# Patient Record
Sex: MALE | Race: WHITE | NOT HISPANIC OR LATINO | Employment: STUDENT | URBAN - METROPOLITAN AREA
[De-identification: names, ages, dates, MRNs, and addresses within clinical notes are randomized per-mention and may not be internally consistent; named-entity substitution may affect disease eponyms.]

---
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
## 2024-01-04 ENCOUNTER — PATIENT OUTREACH (OUTPATIENT)
Dept: GASTROENTEROLOGY | Facility: CLINIC | Age: 19
End: 2024-01-04
Payer: COMMERCIAL

## 2024-12-10 ENCOUNTER — MYC MEDICAL ADVICE (OUTPATIENT)
Dept: FAMILY MEDICINE | Facility: CLINIC | Age: 19
End: 2024-12-10
Payer: COMMERCIAL

## 2024-12-10 NOTE — TELEPHONE ENCOUNTER
Physical is scheduled too soon from recent one, scheduled on 12/20/2024 but not due until 12/22/2024. Sent mychart.    Rosaline Almeida CMA ([facility])

## 2024-12-12 NOTE — TELEPHONE ENCOUNTER
Per patient, insurance is per calendar year so ok to keep as scheduled.    Rosaline Almeida CMA ([facility])

## 2025-06-30 ENCOUNTER — TRANSFERRED RECORDS (OUTPATIENT)
Dept: ADMINISTRATIVE | Facility: CLINIC | Age: 20
End: 2025-06-30
Payer: COMMERCIAL

## 2025-07-01 PROBLEM — D12.6 TUBULAR ADENOMA OF COLON: Status: ACTIVE | Noted: 2025-07-01

## 2025-07-01 NOTE — PROCEDURES
[facility]   [address] [place] [address], [address], [place] [id number]     Patient Name: Jace Levine  Gender:  Male  Exam Date: 06/30/2025 Visit Number:  [id number]  Age: 20 Years YOB: 2005  Attending MD: Ever Olvera MD Medical Record#:  [id number]    Procedure: Colonoscopy   Indications: Previous adenomatous polyp(s)      Referring MD: Referral Self  Primary MD:      Donald FaheyAhrndt MD  Medications: Admitting Medications:   0.9% Normal Saline at TKO   Intra Procedure Medications:   Patient received monitored anesthesia care.     Complications: No immediate complications  ______________________________________________________________________________  Procedure:   An examination of the heart and lungs was performed and found to be within acceptable limits.  .  The patient was therefore deemed a reasonable candidate for endoscopy and sedation.   The risks and benefits of the procedure were explained to the patient.After obtaining informed consent, the patient received monitored anesthesia care and I passed the scope   without difficulty via the rectum to the ileum.  The appendiceal orifice and ic valve were identified.  The scope was retroflexed during the examination  The quality of the prep was excellent  (Miralax/Gatorade/2 tablets Bisacodyl/Magnesium Citrate).    This was a complete examination throughout the entire colon.    Findings:     Single aphthous ulcer in the ileum surrounded by normal appearing mucosa.  Biospies obtained.    Polyp location: sigmoid.  Quantity: 1.  Size: 5 mm.  Polyp shape:  sessile.         Maneuver: polypectomy was performed with a cold snare.       Removal:  complete.  Retrieval: complete.  Bleeding: none.  Remainder of the exam is normal.    Impression:  Colorectal polyp detected on colonoscopy  MD impression comments:  Avoid NSAIDs (Ibuprofen, alleve, advil, naproxen, etc)    Preliminary Plan:  Repeat colonoscopy in 5 years  Pathology  Results:  A: COLON, SIGMOID, POLYP:           1. Ganglioneuroma (see comment)           2. Background colonic mucosa is normal      B: ILEUM, TERMINAL, BIOPSY:           1. Nonspecific active ileitis (see comment)           2. Negative for chronic ileitis, granulomas, and dysplasia      COMMENTS  A. Ganglioneuromas are benign lesions composed of a mixture of ganglion and spindle cells.  When present as a solitary polyp (as appears to be the case here), they are a clinically innocuous. If multiple well-demarcated ganglioneuromas are present, they can be a marker for Cowden syndrome. Multiple poorly-demarcated ganglioneuromatous proliferations can be a marker for either multiple endocrine neoplasia (MEN)2B or neurofibromatosis (NF1).     Please contact us with any questions ([facility] GI pathology Service, [facility] extension  [phone]).      B. The presence of active ileitis is a nonspecific finding. The primary differential diagnostic considerations include medication induced inflammation (typically NSAIDs) and Crohn's disease. There is, however, no evidence of chronicity or specific features of Crohn's disease.      MICROSCOPIC  A: Performed   B: Performed     Electronically signed by: Tawanda Bella MD    Interpreted at [facility], [address] [place] [id number]    Final Plan:  Repeat colonoscopy in 5 years.    We will attempt to contact you at appropriate intervals via [place] mail.  We may not be able to find you or contact you at that time, therefore you should know that the responsibility for following our recommendation rests with you.  If you don't hear from us at the time your procedure is due, please contact our office to schedule an appointment.  If your contact information should change, please contact our office so that we can update your record.  Additional Comments:  Jace,  A single ganglioneuroma typically and not worrisome.  This is not thought to be precancerous.  It was  indeed removed.      _Electronically signed by:___________________  Ever Olvera MD                 06/30/2025    cc: Donald FaheyAhrndt MD  cc: Donald FaheyAhrndt MD

## 2025-07-02 ENCOUNTER — PATIENT OUTREACH (OUTPATIENT)
Dept: GASTROENTEROLOGY | Facility: CLINIC | Age: 20
End: 2025-07-02
Payer: COMMERCIAL